# Patient Record
Sex: FEMALE | Race: WHITE | NOT HISPANIC OR LATINO | Employment: UNEMPLOYED | URBAN - NONMETROPOLITAN AREA
[De-identification: names, ages, dates, MRNs, and addresses within clinical notes are randomized per-mention and may not be internally consistent; named-entity substitution may affect disease eponyms.]

---

## 2017-02-07 ENCOUNTER — OFFICE VISIT (OUTPATIENT)
Dept: CARDIOLOGY | Facility: CLINIC | Age: 67
End: 2017-02-07

## 2017-02-07 VITALS
DIASTOLIC BLOOD PRESSURE: 79 MMHG | WEIGHT: 196.1 LBS | SYSTOLIC BLOOD PRESSURE: 130 MMHG | HEIGHT: 66 IN | HEART RATE: 93 BPM | OXYGEN SATURATION: 95 % | BODY MASS INDEX: 31.52 KG/M2

## 2017-02-07 DIAGNOSIS — I10 ESSENTIAL HYPERTENSION: ICD-10-CM

## 2017-02-07 DIAGNOSIS — E66.9 NON MORBID OBESITY, UNSPECIFIED OBESITY TYPE: ICD-10-CM

## 2017-02-07 DIAGNOSIS — E78.2 MIXED HYPERLIPIDEMIA: ICD-10-CM

## 2017-02-07 DIAGNOSIS — I25.10 CORONARY ARTERY DISEASE INVOLVING NATIVE CORONARY ARTERY OF NATIVE HEART WITHOUT ANGINA PECTORIS: Primary | ICD-10-CM

## 2017-02-07 DIAGNOSIS — E11.9 DIABETES MELLITUS TYPE II, NON INSULIN DEPENDENT (HCC): ICD-10-CM

## 2017-02-07 PROCEDURE — 99214 OFFICE O/P EST MOD 30 MIN: CPT | Performed by: INTERNAL MEDICINE

## 2017-02-07 NOTE — PROGRESS NOTES
Subjective   Darby Zuniga is a 66 y.o. female.     Chief Complaint   Patient presents with   • Follow-up   • Coronary Artery Disease       History of Present Illness     Mrs. Zuniga is a very pleasant 66-year-old woman who presents for follow-up of known coronary artery disease. She initially presented in 2000 with a myocardial infarction. At that time she had stents placed in the right coronary artery. She underwent restenting of the right coronary artery in 2008. She also has an history of an abdominal aortic aneurysm which is previously undergone intervention. She eventually underwent a recent evaluation of her aorta with a CT scan which was unremarkable. She also had relatively atypical chest discomfort and underwent a stress test which was also unremarkable. She also complained of claudication and underwent ABIs which were again normal. In any case her symptoms seemed to progress in terms of her chest discomfort and in November 2015 she had worsening chest discomfort and was found to have severe disease in the right coronary artery and underwent drug-eluting stent implantation to this vessel. She notes that since that time she continues to have rare episodes of chest pain that are not necessarily related to physical activity. She denies that rest resolves them. She states that there is been no clear pattern to her symptoms and that her symptoms are sporadic. There is been no progression. She has been compliant with risk factor modification and did discontinue tobacco consumption.  In any case, she apparently was evaluated by her oncologist who sent her to [place] where she had a stent to an unknown vessel.  She does note that this seemed to make her breathing better as well as her chest discomfort.  She was told that she had a 75% blockage.  She has remained tobacco free as noted above.  She states that her blood pressures have been well-controlled.  She has not had any recent falls.  She has not had a  recent lipid panel.    The following portions of the patient's history were reviewed and updated as appropriate: allergies, current medications, past family history, past medical history, past social history, past surgical history and problem list.    Review of Systems   Constitutional: Negative for activity change, appetite change and fatigue.   HENT: Negative for congestion and tinnitus.    Eyes: Negative for visual disturbance.   Respiratory: Positive for shortness of breath. Negative for cough and chest tightness.    Cardiovascular: Positive for leg swelling. Negative for chest pain and palpitations.   Gastrointestinal: Positive for nausea. Negative for blood in stool and vomiting.   Endocrine: Negative for cold intolerance, heat intolerance and polyuria.   Genitourinary: Negative for dysuria.   Musculoskeletal: Positive for myalgias and neck pain.   Skin: Negative for rash.   Neurological: Positive for light-headedness. Negative for dizziness, syncope and weakness.   Hematological: Bruises/bleeds easily.   Psychiatric/Behavioral: Negative for confusion and sleep disturbance.       Objective   Physical Exam   Constitutional: She is oriented to person, place, and time. She appears well-developed and well-nourished. No distress.   HENT:   Head: Normocephalic and atraumatic.   Nose: Nose normal.   Mouth/Throat: Oropharynx is clear and moist.   Eyes: Conjunctivae and EOM are normal. Right eye exhibits no discharge. Left eye exhibits no discharge. No scleral icterus.   Neck: Normal range of motion. No hepatojugular reflux and no JVD present. Carotid bruit is not present. No tracheal deviation present.   Cardiovascular: Normal rate, regular rhythm, S1 normal, S2 normal and intact distal pulses.  PMI is not displaced.  Exam reveals no gallop, no S3, no S4 and no friction rub.    No murmur heard.  Pulmonary/Chest: Effort normal and breath sounds normal. No accessory muscle usage. No respiratory distress. She has no  wheezes. She has no rales. She exhibits no tenderness.   There is mildly decreased breath sounds bilaterally.   Abdominal: Soft. Bowel sounds are normal. She exhibits no distension. There is no tenderness.   Musculoskeletal: Normal range of motion. She exhibits no edema or tenderness.       Vascular Status -  Her exam exhibits no right foot edema. Her exam exhibits no left foot edema.  Neurological: She is alert and oriented to person, place, and time. No cranial nerve deficit. Coordination normal.   Skin: Skin is warm and dry. She is not diaphoretic.   Nursing note and vitals reviewed.      Procedures    Assessment/Plan       Coronary Artery Disease. In regard to her history of coronary artery disease, it is somewhat difficult to ascertain her current status.  Specifically, she seems to deny that she had a coronary stent placed however does state that her chest discomfort and shortness of breath seem improved.  I will try to obtain a copy of her records to further clarify her situation.  In any case, she is not having any significant symptoms at this time.      Tobacco.  In regard to their history of tobacco consumption, I have congratulated her on discontinuing tobacco products and encouraged her to remain tobacco free.     Hypertension.  In regard to her history of hypertension, her blood pressures are currently relatively well controlled.  I have not changed any of her medications.  I've asked her to maintain a blood pressure diary.     Hyperlipidemia.  Given the patient's history of CAD I have asked them to obtain a fasting lipid panel and an AST and ALT.    In short, it is been a pleasure to participate in Darby's care, I look forward to seeing her again in 6 months.

## 2017-02-14 ENCOUNTER — TELEPHONE (OUTPATIENT)
Dept: CARDIOLOGY | Facility: CLINIC | Age: 67
End: 2017-02-14

## 2017-02-14 NOTE — TELEPHONE ENCOUNTER
PT CALLED STATING THAT DR. EWING WANTED TO KNOW THE NAME & THE NUMBER FOR HER CANCER DRRay WRIGHT  #[phone]

## 2017-03-23 ENCOUNTER — LAB (OUTPATIENT)
Dept: LAB | Facility: HOSPITAL | Age: 67
End: 2017-03-23
Attending: INTERNAL MEDICINE

## 2017-03-23 DIAGNOSIS — E78.2 MIXED HYPERLIPIDEMIA: ICD-10-CM

## 2017-03-23 DIAGNOSIS — I25.10 CORONARY ARTERY DISEASE INVOLVING NATIVE CORONARY ARTERY OF NATIVE HEART WITHOUT ANGINA PECTORIS: ICD-10-CM

## 2017-03-23 LAB
ALT SERPL W P-5'-P-CCNC: 27 U/L (ref 10–36)
AST SERPL-CCNC: 32 U/L (ref 10–30)
CHOLEST SERPL-MCNC: 169 MG/DL (ref 0–200)
HDLC SERPL-MCNC: 76 MG/DL (ref 60–100)
LDLC SERPL CALC-MCNC: 64 MG/DL (ref 0–100)
LDLC/HDLC SERPL: 0.84 {RATIO}
TRIGL SERPL-MCNC: 146 MG/DL (ref 0–150)
VLDLC SERPL-MCNC: 29.2 MG/DL

## 2017-03-23 PROCEDURE — 84460 ALANINE AMINO (ALT) (SGPT): CPT | Performed by: INTERNAL MEDICINE

## 2017-03-23 PROCEDURE — 36415 COLL VENOUS BLD VENIPUNCTURE: CPT

## 2017-03-23 PROCEDURE — 80061 LIPID PANEL: CPT | Performed by: INTERNAL MEDICINE

## 2017-03-23 PROCEDURE — 84450 TRANSFERASE (AST) (SGOT): CPT | Performed by: INTERNAL MEDICINE

## 2017-03-27 ENCOUNTER — TELEPHONE (OUTPATIENT)
Dept: CARDIOLOGY | Facility: CLINIC | Age: 67
End: 2017-03-27

## 2017-03-27 NOTE — TELEPHONE ENCOUNTER
----- Message from Arnold Vanegas MD sent at 3/25/2017  9:23 AM EDT -----  Let her know her cholesterol test was good    sdf      CALLED PT TO MAKE HER AWARE THAT PER DR. VANEGAS HER CHOLESTEROL TEST WAS GOOD.    PT AWARE & UNDERSTANDS.    CSMA

## 2017-06-19 RX ORDER — METOPROLOL TARTRATE 50 MG/1
TABLET, FILM COATED ORAL
Qty: 60 TABLET | Refills: 4 | Status: SHIPPED | OUTPATIENT
Start: 2017-06-19 | End: 2018-02-20

## 2017-08-25 ENCOUNTER — OFFICE VISIT (OUTPATIENT)
Dept: CARDIOLOGY | Facility: CLINIC | Age: 67
End: 2017-08-25

## 2017-08-25 VITALS
SYSTOLIC BLOOD PRESSURE: 102 MMHG | BODY MASS INDEX: 32.24 KG/M2 | HEART RATE: 76 BPM | DIASTOLIC BLOOD PRESSURE: 64 MMHG | HEIGHT: 66 IN | OXYGEN SATURATION: 94 % | WEIGHT: 200.6 LBS

## 2017-08-25 DIAGNOSIS — E78.2 MIXED HYPERLIPIDEMIA: ICD-10-CM

## 2017-08-25 DIAGNOSIS — I10 ESSENTIAL HYPERTENSION: ICD-10-CM

## 2017-08-25 DIAGNOSIS — I25.10 CORONARY ARTERY DISEASE INVOLVING NATIVE CORONARY ARTERY OF NATIVE HEART WITHOUT ANGINA PECTORIS: Primary | ICD-10-CM

## 2017-08-25 DIAGNOSIS — Z72.0 TOBACCO CONSUMPTION: ICD-10-CM

## 2017-08-25 DIAGNOSIS — E11.9 DIABETES MELLITUS TYPE II, NON INSULIN DEPENDENT (HCC): ICD-10-CM

## 2017-08-25 DIAGNOSIS — E66.9 NON MORBID OBESITY, UNSPECIFIED OBESITY TYPE: ICD-10-CM

## 2017-08-25 DIAGNOSIS — I25.2 MI, OLD: ICD-10-CM

## 2017-08-25 PROCEDURE — 99214 OFFICE O/P EST MOD 30 MIN: CPT | Performed by: INTERNAL MEDICINE

## 2017-08-25 RX ORDER — METOPROLOL TARTRATE 50 MG/1
25 TABLET, FILM COATED ORAL 2 TIMES DAILY
COMMUNITY

## 2017-08-25 RX ORDER — CHOLECALCIFEROL (VITAMIN D3) 25 MCG
TABLET,CHEWABLE ORAL DAILY
COMMUNITY

## 2017-08-25 RX ORDER — PHENYTOIN SODIUM 100 MG/1
CAPSULE, EXTENDED RELEASE ORAL
COMMUNITY

## 2017-08-25 RX ORDER — HYDROXYUREA 500 MG/1
500 CAPSULE ORAL 2 TIMES DAILY
COMMUNITY

## 2017-08-25 RX ORDER — ATORVASTATIN CALCIUM 40 MG/1
40 TABLET, FILM COATED ORAL NIGHTLY
COMMUNITY

## 2017-08-25 RX ORDER — MONTELUKAST SODIUM 4 MG/1
1 TABLET, CHEWABLE ORAL DAILY
COMMUNITY

## 2017-08-25 RX ORDER — BENZONATATE 200 MG/1
200 CAPSULE ORAL 3 TIMES DAILY
COMMUNITY

## 2017-08-25 RX ORDER — ESCITALOPRAM OXALATE 10 MG/1
10 TABLET ORAL DAILY
COMMUNITY

## 2017-08-25 NOTE — PROGRESS NOTES
Subjective   Darby Zuniga is a 67 y.o. female.     Chief Complaint   Patient presents with   • Follow-up     LEG AND FEET SWELLING   • Coronary Artery Disease   • Hypertension   • Hyperlipidemia       History of Present Illness     Mrs. Zuniga is a very pleasant 66-year-old woman who presents for follow-up of known coronary artery disease. She initially presented in 2000 with a myocardial infarction. At that time she had stents placed in the right coronary artery. She underwent restenting of the right coronary artery in 2008. She also has an history of an abdominal aortic aneurysm which is previously undergone intervention. She eventually underwent a recent evaluation of her aorta with a CT scan which was unremarkable. She also had relatively atypical chest discomfort and underwent a stress test which was also unremarkable. She also complained of claudication and underwent ABIs which were again normal. In any case her symptoms seemed to progress in terms of her chest discomfort and in November 2015 she had worsening chest discomfort and was found to have severe disease in the right coronary artery and underwent drug-eluting stent implantation to this vessel. She notes that since that time she continues to have rare episodes of chest pain that are not necessarily related to physical activity. She denies that rest resolves them. She states that there is been no clear pattern to her symptoms and that her symptoms are sporadic. There is been no progression. She has been compliant with risk factor modification and did discontinue tobacco consumption.  In any case, she apparently was evaluated by her oncologist who sent her to [place] where she underwent stent implantation to her left subclavian artery.  She currently states that she is doing relatively well.  She denies any angina or anginal-like symptoms.  She denies any congestive heart failure symptoms.  She denies any palpitations, near syncope or syncopal  symptoms.  She notes that her blood pressures have demonstrated reasonable control.  She has not had any significant change in her weight.  She does note lower extremity discomfort.  She did have ABIs in 2015 which were reasonably normal     The following portions of the patient's history were reviewed and updated as appropriate: allergies, current medications, past family history, past medical history, past social history, past surgical history and problem list.    Review of Systems   Constitutional: Negative for activity change, appetite change and fatigue.   HENT: Negative for congestion and tinnitus.    Eyes: Positive for visual disturbance.   Respiratory: Positive for chest tightness. Negative for cough and shortness of breath.    Cardiovascular: Positive for chest pain and leg swelling. Negative for palpitations.   Gastrointestinal: Negative for blood in stool, nausea and vomiting.   Endocrine: Positive for polyuria. Negative for cold intolerance and heat intolerance.   Genitourinary: Negative for dysuria.   Musculoskeletal: Positive for myalgias and neck pain.   Skin: Negative for rash.   Neurological: Positive for dizziness, weakness and light-headedness. Negative for syncope.   Hematological: Bruises/bleeds easily.   Psychiatric/Behavioral: Negative for confusion and sleep disturbance.       Objective   Physical Exam   Constitutional: She is oriented to person, place, and time. She appears well-developed and well-nourished. No distress.   HENT:   Head: Normocephalic and atraumatic.   Nose: Nose normal.   Mouth/Throat: Oropharynx is clear and moist.   Eyes: Conjunctivae and EOM are normal. Right eye exhibits no discharge. Left eye exhibits no discharge. No scleral icterus.   Neck: Normal range of motion. No hepatojugular reflux and no JVD present. Carotid bruit is not present. No tracheal deviation present.   Cardiovascular: Normal rate, regular rhythm, S1 normal, S2 normal and intact distal pulses.  PMI is  not displaced.  Exam reveals no gallop, no S3, no S4 and no friction rub.    No murmur heard.  Pulmonary/Chest: Effort normal and breath sounds normal. No accessory muscle usage. No respiratory distress. She has no wheezes. She has no rales. She exhibits no tenderness.   Abdominal: Soft. Bowel sounds are normal. She exhibits no distension. There is no tenderness.   Musculoskeletal: Normal range of motion. She exhibits no edema or tenderness.       Vascular Status -  Her exam exhibits no right foot edema. Her exam exhibits no left foot edema.  Neurological: She is alert and oriented to person, place, and time. No cranial nerve deficit. Coordination normal.   Skin: Skin is warm and dry. She is not diaphoretic.   Nursing note and vitals reviewed.      Procedures    Assessment/Plan     Coronary Artery Disease. In regard to her history of coronary artery disease she currently appears to be relatively stable.  Specifically, she denies any angina or anginal-like symptoms and has been compliant on medical therapy.  For now I will continue her current medications as is.    PVD In regard to her history of peripheral vascular disease, I feel that she is currently stable.  Specifically, her radial pulses are intact.  She does have a question of some diminished pulses in her lower extremities however she did have a recent TAO performed which was unremarkable.  Furthermore, she is not symptomatic.  For now I would simply continue her current medications as is.     Tobacco.  In regard to their history of tobacco consumption, I have congratulated her on discontinuing tobacco products and encouraged her to remain tobacco free.      Hypertension.  In regard to her history of hypertension, her blood pressures are currently relatively well controlled.  I have not changed any of her medications.  I've asked her to maintain a blood pressure diary.      Hyperlipidemia.  Given the patient's history of CAD I have asked them to obtain a  fasting lipid panel and an AST and ALT.    In short, it is been a pleasure to participate in Darby's care, I look forward to seeing her again in 6 months.

## 2017-11-15 RX ORDER — METOPROLOL TARTRATE 50 MG/1
TABLET, FILM COATED ORAL
Qty: 60 TABLET | Refills: 11 | Status: SHIPPED | OUTPATIENT
Start: 2017-11-15 | End: 2018-02-20

## 2017-11-26 ENCOUNTER — APPOINTMENT (OUTPATIENT)
Dept: GENERAL RADIOLOGY | Facility: HOSPITAL | Age: 67
End: 2017-11-26

## 2017-11-26 ENCOUNTER — HOSPITAL ENCOUNTER (EMERGENCY)
Facility: HOSPITAL | Age: 67
Discharge: HOME | End: 2017-11-26
Attending: FAMILY MEDICINE | Admitting: NURSE PRACTITIONER

## 2017-11-26 VITALS
BODY MASS INDEX: 29.99 KG/M2 | HEIGHT: 65 IN | DIASTOLIC BLOOD PRESSURE: 65 MMHG | HEART RATE: 98 BPM | TEMPERATURE: 99.1 F | OXYGEN SATURATION: 93 % | WEIGHT: 180 LBS | SYSTOLIC BLOOD PRESSURE: 106 MMHG | RESPIRATION RATE: 18 BRPM

## 2017-11-26 DIAGNOSIS — J20.9 ACUTE BRONCHITIS, UNSPECIFIED ORGANISM: Primary | ICD-10-CM

## 2017-11-26 LAB
ACANTHOCYTES BLD QL SMEAR: ABNORMAL
ALBUMIN SERPL-MCNC: 4.4 G/DL (ref 3.4–4.8)
ALBUMIN/GLOB SERPL: 1.1 G/DL (ref 1.5–2.5)
ALP SERPL-CCNC: 246 U/L (ref 35–104)
ALT SERPL W P-5'-P-CCNC: 22 U/L (ref 10–36)
ANION GAP SERPL CALCULATED.3IONS-SCNC: 8.7 MMOL/L (ref 3.6–11.2)
AST SERPL-CCNC: 25 U/L (ref 10–30)
BILIRUB SERPL-MCNC: 0.4 MG/DL (ref 0.2–1.8)
BUN BLD-MCNC: 14 MG/DL (ref 7–21)
BUN/CREAT SERPL: 20.3 (ref 7–25)
CALCIUM SPEC-SCNC: 9.3 MG/DL (ref 7.7–10)
CHLORIDE SERPL-SCNC: 100 MMOL/L (ref 99–112)
CO2 SERPL-SCNC: 30.3 MMOL/L (ref 24.3–31.9)
CREAT BLD-MCNC: 0.69 MG/DL (ref 0.43–1.29)
D-LACTATE SERPL-SCNC: 1.6 MMOL/L (ref 0.5–2)
DEPRECATED RDW RBC AUTO: 60.4 FL (ref 37–54)
EOSINOPHIL # BLD MANUAL: 0.03 10*3/MM3 (ref 0–0.7)
EOSINOPHIL NFR BLD MANUAL: 1 % (ref 0–7)
ERYTHROCYTE [DISTWIDTH] IN BLOOD BY AUTOMATED COUNT: 18.2 % (ref 11.5–14.5)
FLUAV AG NPH QL: NEGATIVE
FLUBV AG NPH QL IA: NEGATIVE
GFR SERPL CREATININE-BSD FRML MDRD: 85 ML/MIN/1.73
GLOBULIN UR ELPH-MCNC: 4.1 GM/DL
GLUCOSE BLD-MCNC: 111 MG/DL (ref 70–110)
HCT VFR BLD AUTO: 27.8 % (ref 37–47)
HGB BLD-MCNC: 8.8 G/DL (ref 12–16)
LYMPHOCYTES # BLD MANUAL: 1.96 10*3/MM3 (ref 1–3)
LYMPHOCYTES NFR BLD MANUAL: 7 % (ref 0–12)
LYMPHOCYTES NFR BLD MANUAL: 77 % (ref 16–46)
MCH RBC QN AUTO: 30 PG (ref 27–33)
MCHC RBC AUTO-ENTMCNC: 31.7 G/DL (ref 33–37)
MCV RBC AUTO: 94.9 FL (ref 80–94)
MONOCYTES # BLD AUTO: 0.18 10*3/MM3 (ref 0.1–0.9)
NEUTROPHILS # BLD AUTO: 0.38 10*3/MM3 (ref 1.4–6.5)
NEUTROPHILS NFR BLD MANUAL: 15 % (ref 40–75)
OSMOLALITY SERPL CALC.SUM OF ELEC: 278.7 MOSM/KG (ref 273–305)
PLATELET # BLD AUTO: 64 10*3/MM3 (ref 130–400)
PMV BLD AUTO: 12.5 FL (ref 6–10)
POTASSIUM BLD-SCNC: 3.4 MMOL/L (ref 3.5–5.3)
PROT SERPL-MCNC: 8.5 G/DL (ref 6–8)
RBC # BLD AUTO: 2.93 10*6/MM3 (ref 4.2–5.4)
SCAN SLIDE: NORMAL
SMALL PLATELETS BLD QL SMEAR: ABNORMAL
SODIUM BLD-SCNC: 139 MMOL/L (ref 135–153)
TROPONIN I SERPL-MCNC: 0.01 NG/ML
TROPONIN I SERPL-MCNC: 0.01 NG/ML
WBC NRBC COR # BLD: 2.55 10*3/MM3 (ref 4.5–12.5)

## 2017-11-26 PROCEDURE — 85025 COMPLETE CBC W/AUTO DIFF WBC: CPT | Performed by: PHYSICIAN ASSISTANT

## 2017-11-26 PROCEDURE — 94640 AIRWAY INHALATION TREATMENT: CPT

## 2017-11-26 PROCEDURE — 96365 THER/PROPH/DIAG IV INF INIT: CPT

## 2017-11-26 PROCEDURE — 80053 COMPREHEN METABOLIC PANEL: CPT | Performed by: PHYSICIAN ASSISTANT

## 2017-11-26 PROCEDURE — 93005 ELECTROCARDIOGRAM TRACING: CPT | Performed by: PHYSICIAN ASSISTANT

## 2017-11-26 PROCEDURE — 84484 ASSAY OF TROPONIN QUANT: CPT | Performed by: PHYSICIAN ASSISTANT

## 2017-11-26 PROCEDURE — 87804 INFLUENZA ASSAY W/OPTIC: CPT | Performed by: PHYSICIAN ASSISTANT

## 2017-11-26 PROCEDURE — 94799 UNLISTED PULMONARY SVC/PX: CPT

## 2017-11-26 PROCEDURE — 25010000002 CEFTRIAXONE: Performed by: PHYSICIAN ASSISTANT

## 2017-11-26 PROCEDURE — 99284 EMERGENCY DEPT VISIT MOD MDM: CPT

## 2017-11-26 PROCEDURE — 83605 ASSAY OF LACTIC ACID: CPT | Performed by: PHYSICIAN ASSISTANT

## 2017-11-26 PROCEDURE — 93010 ELECTROCARDIOGRAM REPORT: CPT | Performed by: INTERNAL MEDICINE

## 2017-11-26 PROCEDURE — 25010000002 METHYLPREDNISOLONE PER 125 MG: Performed by: PHYSICIAN ASSISTANT

## 2017-11-26 PROCEDURE — 71020 XR CHEST 2 VW: CPT | Performed by: RADIOLOGY

## 2017-11-26 PROCEDURE — 96375 TX/PRO/DX INJ NEW DRUG ADDON: CPT

## 2017-11-26 PROCEDURE — 85007 BL SMEAR W/DIFF WBC COUNT: CPT | Performed by: PHYSICIAN ASSISTANT

## 2017-11-26 PROCEDURE — 71020 HC CHEST PA AND LATERAL: CPT

## 2017-11-26 RX ORDER — ASPIRIN 325 MG
325 TABLET, DELAYED RELEASE (ENTERIC COATED) ORAL ONCE
Status: COMPLETED | OUTPATIENT
Start: 2017-11-26 | End: 2017-11-26

## 2017-11-26 RX ORDER — ALBUTEROL SULFATE 90 UG/1
2 AEROSOL, METERED RESPIRATORY (INHALATION) EVERY 4 HOURS PRN
Qty: 6.7 G | Refills: 0 | Status: SHIPPED | OUTPATIENT
Start: 2017-11-26

## 2017-11-26 RX ORDER — IPRATROPIUM BROMIDE AND ALBUTEROL SULFATE 2.5; .5 MG/3ML; MG/3ML
3 SOLUTION RESPIRATORY (INHALATION) ONCE
Status: COMPLETED | OUTPATIENT
Start: 2017-11-26 | End: 2017-11-26

## 2017-11-26 RX ORDER — PREDNISONE 20 MG/1
20 TABLET ORAL 2 TIMES DAILY
Qty: 10 TABLET | Refills: 0 | Status: SHIPPED | OUTPATIENT
Start: 2017-11-26 | End: 2017-12-01

## 2017-11-26 RX ORDER — METHYLPREDNISOLONE SODIUM SUCCINATE 125 MG/2ML
125 INJECTION, POWDER, LYOPHILIZED, FOR SOLUTION INTRAMUSCULAR; INTRAVENOUS ONCE
Status: COMPLETED | OUTPATIENT
Start: 2017-11-26 | End: 2017-11-26

## 2017-11-26 RX ADMIN — METHYLPREDNISOLONE SODIUM SUCCINATE 125 MG: 125 INJECTION, POWDER, FOR SOLUTION INTRAMUSCULAR; INTRAVENOUS at 17:36

## 2017-11-26 RX ADMIN — ASPIRIN 325 MG: 325 TABLET, COATED ORAL at 17:37

## 2017-11-26 RX ADMIN — IPRATROPIUM BROMIDE AND ALBUTEROL SULFATE 3 ML: .5; 3 SOLUTION RESPIRATORY (INHALATION) at 17:31

## 2017-11-26 RX ADMIN — CEFTRIAXONE 1 G: 1 INJECTION, POWDER, FOR SOLUTION INTRAMUSCULAR; INTRAVENOUS at 21:10

## 2017-11-26 NOTE — ED NOTES
Pt and family informed of reason for wait and plan of care. Pt resting on stretcher, alert and oriented, skin pwd, respirations even and unlabored. Fall precautions maintained.     Anjana Camilo RN  11/26/17 1942

## 2017-11-26 NOTE — ED PROVIDER NOTES
Subjective   Patient is a 67 y.o. female presenting with cough.   History provided by:  Patient   used: No    Cough   Cough characteristics:  Productive  Sputum characteristics:  Clear  Severity:  Moderate  Onset quality:  Sudden  Duration:  3 days  Timing:  Constant  Progression:  Unchanged  Chronicity:  Recurrent  Smoker: yes    Context: upper respiratory infection    Relieved by:  None tried  Worsened by:  Deep breathing and exposure to cold air  Ineffective treatments:  Beta-agonist inhaler and cough suppressants  Associated symptoms: myalgias, shortness of breath and wheezing    Associated symptoms: no chest pain, no chills, no ear pain, no fever, no headaches, no rash and no sore throat        Review of Systems   Constitutional: Negative for chills and fever.   HENT: Negative for congestion, ear pain and sore throat.    Respiratory: Positive for cough, shortness of breath and wheezing.    Cardiovascular: Negative for chest pain.   Gastrointestinal: Negative for diarrhea, nausea and vomiting.   Genitourinary: Negative for dysuria and flank pain.   Musculoskeletal: Positive for myalgias.   Skin: Negative for rash.   Neurological: Negative for headaches.   Psychiatric/Behavioral: The patient is not nervous/anxious.    All other systems reviewed and are negative.      Past Medical History:   Diagnosis Date   • AAA (abdominal aortic aneurysm)    • CAD (coronary artery disease)    • Diabetes mellitus     non-insulin dependent   • Elevated platelet count    • History of EKG 01/14/2016    BORDERLINE   • History of nuclear stress test    • Hyperlipidemia    • Hypertension    • Myocardial infarction 2000   • Obesity    • Tobacco abuse        Allergies   Allergen Reactions   • Ace Inhibitors        Past Surgical History:   Procedure Laterality Date   • CORONARY STENT PLACEMENT         Family History   Problem Relation Age of Onset   • Heart disease Mother      PACEMAKER   • Heart disease Brother       OPEN HEART       Social History     Social History   • Marital status: Single     Spouse name: N/A   • Number of children: N/A   • Years of education: N/A     Social History Main Topics   • Smoking status: Former Smoker   • Smokeless tobacco: Never Used   • Alcohol use No   • Drug use: No   • Sexual activity: Not Asked     Other Topics Concern   • None     Social History Narrative   • None           Objective   Physical Exam   Constitutional: She is oriented to person, place, and time. She appears well-developed and well-nourished.   HENT:   Head: Normocephalic.   Mouth/Throat: Oropharynx is clear and moist.   Neck: Neck supple.   Cardiovascular: Normal rate and regular rhythm.    Pulmonary/Chest: Effort normal. She has wheezes. She has rhonchi.   Abdominal: Soft. Bowel sounds are normal. There is no tenderness.   Musculoskeletal: Normal range of motion.   Neurological: She is alert and oriented to person, place, and time.   Skin: Skin is warm and dry.   Psychiatric: She has a normal mood and affect. Her behavior is normal. Judgment and thought content normal.   Nursing note and vitals reviewed.      Procedures         ED Course  ED Course   Comment By Time   Report given to WOODY Osorio 11/26 1954                  [facility]    Final diagnoses:   Acute bronchitis, unspecified organism            WOODY Hannah  11/27/17 2379

## 2017-11-27 NOTE — ED PROVIDER NOTES
Subjective   History of Present Illness    Review of Systems    Past Medical History:   Diagnosis Date   • AAA (abdominal aortic aneurysm)    • CAD (coronary artery disease)    • Diabetes mellitus     non-insulin dependent   • Elevated platelet count    • History of EKG 01/14/2016    BORDERLINE   • History of nuclear stress test    • Hyperlipidemia    • Hypertension    • Myocardial infarction 2000   • Obesity    • Tobacco abuse        Allergies   Allergen Reactions   • Ace Inhibitors        Past Surgical History:   Procedure Laterality Date   • CORONARY STENT PLACEMENT         Family History   Problem Relation Age of Onset   • Heart disease Mother      PACEMAKER   • Heart disease Brother      OPEN HEART       Social History     Social History   • Marital status: Single     Spouse name: N/A   • Number of children: N/A   • Years of education: N/A     Social History Main Topics   • Smoking status: Former Smoker   • Smokeless tobacco: Never Used   • Alcohol use No   • Drug use: No   • Sexual activity: Not Asked     Other Topics Concern   • None     Social History Narrative   • None           Objective   Physical Exam    Procedures         ED Course  ED Course   Comment By Time   Report given to WOODY Osorio 11/26 1955                  [facility]    Final diagnoses:   Acute bronchitis, unspecified organism            Fernando Bryant, APRN  11/26/17 2100

## 2018-02-20 ENCOUNTER — OFFICE VISIT (OUTPATIENT)
Dept: CARDIOLOGY | Facility: CLINIC | Age: 68
End: 2018-02-20

## 2018-02-20 VITALS
WEIGHT: 192 LBS | OXYGEN SATURATION: 90 % | HEIGHT: 66 IN | BODY MASS INDEX: 30.86 KG/M2 | DIASTOLIC BLOOD PRESSURE: 59 MMHG | HEART RATE: 89 BPM | SYSTOLIC BLOOD PRESSURE: 96 MMHG

## 2018-02-20 DIAGNOSIS — I10 ESSENTIAL HYPERTENSION: ICD-10-CM

## 2018-02-20 DIAGNOSIS — E78.2 MIXED HYPERLIPIDEMIA: ICD-10-CM

## 2018-02-20 DIAGNOSIS — I25.2 MI, OLD: ICD-10-CM

## 2018-02-20 DIAGNOSIS — I25.10 CORONARY ARTERY DISEASE INVOLVING NATIVE CORONARY ARTERY OF NATIVE HEART WITHOUT ANGINA PECTORIS: Primary | ICD-10-CM

## 2018-02-20 PROCEDURE — 99213 OFFICE O/P EST LOW 20 MIN: CPT | Performed by: INTERNAL MEDICINE

## 2018-02-20 NOTE — PROGRESS NOTES
[facility] CARDIOLOGY  [address]  [place] [address]  Phone: [phone]  Fax: [phone]    02/20/2018    Chief Complaint: Routine followup cad    History:   Darby Zuniga is a 67 y.o. female seen in followup, seen for CAD. Has chest discomfort. Some SOB. Nausea. Hurts in chest and back. Pt with anemia and requiring transfusion. Pt with leukemia and taking CTX. CTX for last year. On going therapy. Treated in [place].   Chest discomfort, epigastric. Radiated to should blades and back. Wt loss 20#. Pt with DM. Low BP today but no syncope. Pt with low energy. Taking 50 mg BID.              Mrs. Zuniga is a very pleasant 66-year-old woman who presents for follow-up of known coronary artery disease. She initially presented in 2000 with a myocardial infarction. At that time she had stents placed in the right coronary artery. She underwent restenting of the right coronary artery in 2008. She also has an history of an abdominal aortic aneurysm which is previously undergone intervention. She eventually underwent a recent evaluation of her aorta with a CT scan which was unremarkable. She also had relatively atypical chest discomfort and underwent a stress test which was also unremarkable. She also complained of claudication and underwent ABIs which were again normal. In any case her symptoms seemed to progress in terms of her chest discomfort and in November 2015 she had worsening chest discomfort and was found to have severe disease in the right coronary artery and underwent drug-eluting stent implantation to this vessel. She notes that since that time she continues to have rare episodes of chest pain that are not necessarily related to physical activity. She denies that rest resolves them. She states that there is been no clear pattern to her symptoms and that her symptoms are sporadic. There is been no progression. She has been compliant with risk factor modification and did  discontinue tobacco consumption.  In any case, she apparently was evaluated by her oncologist who sent her to [place] where she underwent stent implantation to her left subclavian artery.  She currently states that she is doing relatively well.  She denies any angina or anginal-like symptoms.  She denies any congestive heart failure symptoms.  She denies any palpitations, near syncope or syncopal symptoms.  She notes that her blood pressures have demonstrated reasonable control.  She has not had any significant change in her weight.  She does note lower extremity discomfort.  She did have ABIs in 2015 which were reasonably normal    Past Medical History:   Diagnosis Date   • AAA (abdominal aortic aneurysm)    • CAD (coronary artery disease)    • Diabetes mellitus     non-insulin dependent   • Elevated platelet count    • History of EKG 01/14/2016    BORDERLINE   • History of nuclear stress test    • Hyperlipidemia    • Hypertension    • Myocardial infarction 2000   • Obesity    • Tobacco abuse        Past Social History:  Social History     Social History   • Marital status: Single     Spouse name: N/A   • Number of children: N/A   • Years of education: N/A     Social History Main Topics   • Smoking status: Former Smoker   • Smokeless tobacco: Never Used   • Alcohol use No   • Drug use: No   • Sexual activity: Not Asked     Other Topics Concern   • None     Social History Narrative       Past Family History:  Family History   Problem Relation Age of Onset   • Heart disease Mother      PACEMAKER   • Heart disease Brother      OPEN HEART       Review of Systems:   Please see HPI  Constitution: No chills, no rigors, no unexplained weight loss or weight gain  Eyes:  No diplopia, no blurred vision, no loss of vision, conjunctiva is pink and sclera is anicteric  ENT:  No tinnitus, no otorrhea, no epistaxis, no sore throat   Respiratory: No cough, no hemoptysis  Cardiovascular: see HPI  Gastrointestinal: No nausea, no  vomiting, no hematemesis, no diarrhea or constipation, no melena  Genitourinary: No frequency of dysuria no hematuria  Integument: No pruritis and  no skin rash  Hematologic / Lymphatic: No excessive bleeding, easy bruising, fatigue, lymphadenopathy and petechiae  Musculoskeletal: No joint pain, joint stiffness, joint swelling, muscle pain, muscle weakness and neck pain  Neurological: No dizziness, headaches, light headedness, seizures and vertigo  Endocrine: No frequent urination and nocturia, temperature intolerance, weight gain, unintended and weight loss, unintended      Current Outpatient Prescriptions on File Prior to Visit   Medication Sig Dispense Refill   • albuterol (PROVENTIL HFA;VENTOLIN HFA) 108 (90 Base) MCG/ACT inhaler Inhale 2 puffs Every 4 (Four) Hours As Needed for Wheezing. 6.7 g 0   • ALPRAZolam (XANAX) 2 MG tablet Take 2 mg by mouth 3 (three) times a day     • aspirin 81 MG tablet Take 81 mg by mouth daily     • atorvastatin (LIPITOR) 40 MG tablet Take 40 mg by mouth Every Night.     • benzonatate (TESSALON) 200 MG capsule Take 200 mg by mouth 3 (Three) Times a Day.     • citalopram (CeleXA) 20 MG tablet Take 20 mg by mouth daily     • clopidogrel (PLAVIX) 75 MG tablet Take 75 mg by mouth daily     • colestipol (COLESTID) 1 g tablet Take 1 g by mouth Daily.     • cyanocobalamin (VITAMIN B-12) 2500 MCG tablet tablet Take  by mouth Daily.     • cyclobenzaprine (FLEXERIL) 10 MG tablet Take 10 mg by mouth daily     • escitalopram (LEXAPRO) 10 MG tablet Take 10 mg by mouth Daily.     • hydrochlorothiazide (HYDRODIURIL) 25 MG tablet Take 25 mg by mouth daily as needed     • Hydrocodone-Acetaminophen (LORTAB 10)  MG per tablet Take 1 tablet by mouth     • hydroxyurea (HYDREA) 500 MG capsule Take 500 mg by mouth 2 (Two) Times a Day.     • metFORMIN (GLUCOPHAGE) 500 MG tablet Take 500 mg by mouth Daily With Breakfast.     • metoprolol tartrate (LOPRESSOR) 50 MG tablet TAKE ONE TABLET BY MOUTH TWICE  A DAY 60 tablet 4   • metoprolol tartrate (LOPRESSOR) 50 MG tablet Take 50 mg by mouth 2 (Two) Times a Day.     • metoprolol tartrate (LOPRESSOR) 50 MG tablet TAKE ONE TABLET BY MOUTH TWICE A DAY 60 tablet 11   • omeprazole (PriLOSEC) 20 MG capsule Take 20 mg by mouth 2 (two) times a day     • phenytoin (DILANTIN) 100 MG ER capsule Take  by mouth 5 (Five) Times a Day.       No current facility-administered medications on file prior to visit.        Allergies   Allergen Reactions   • Ace Inhibitors        Objective:  Vitals:    18 1353   BP: 96/59   Pulse: 89   SpO2: 90%     Comfortable NAD  PERRL, conjunctiva clear  Neck supple, no JVD or thyromegaly appreciated  S1/S2 RRR, no m/r/g  Lungs CTA B, normal effort  Abdomen S/NT/ND (+) BS, no HSM appreciated  Extremities warm, no clubbing, cyanosis, or edema  Normal gait  No visible or palpable skin lesions  A/Ox4, mood and affect appropriate  Pulse exam: Mark's:    DATA:            Results for orders placed during the hospital encounter of 03/02/15   Stress test with myocardial perfusion    Narrative Patient:      JULIANE VAN    [facility] Rec#:     [id number]            :          1950            Date:         2015            Age:          64y                   Account#:     [id number]           Height:       168.4 cm / 66.3 in  Accession#:   [id number]               Sex:          F                      Weight:       86.4 kg / 190.4 lbs  BSA:          1.97  Admit Date#:  2015            Loc:          exam room 2    Referring:    RENY  Performing:   ALL  Reading:      Arnold Vanegas MD   Technologis[facility]CCARTCASSIE PARRA [facility]  Nuclear [facility]  ______________________________________________________________________    Combined Nuclear/Stress Test    ICD Codes:     • Diagnosis of CAD  • 401.1 Hypertension, benign  essential  • 411.1 Coronary syndrome, intermediate  • 414.9 Chronic  ischemic heart disease NOS  • 443.9,  272.4 491.20     Checklists:     • Patient verbally identified self  • Patient identified by ID band  • Patient consent obtained in lab  • Procedure verified and explained to patient  • History and physical performed  • Last Caffeine 03/01/2015 18:00:00  • Last Meal 03/01/2015 18:00:00    History of:   • Dyslipidemia• Lipid Therapy• Hypertension• Chronic Lung  Disease• Coronary Artery Disease (CAD)HX MI X1, STENTS X4.• Family  History of CAD• Family History of CHF• Menopause• Oophorectomy• Allergic  to ACE INHIBITERS• Diabetes, managed with oral agents• Diabetes for 5  years• Current smoker1/2 PPD• Typical Angina• Dyspnea at rest• Dyspnea  with minimal exertion• Dyspnea with normal daily activity• Dyspnea with  more than normal activity• Years at menopause or oophorectomy: 51No  History of:• Renal Failure• CHF• Peripheral Vascular Disease•  Cerebrovascular Disease• Estrogen Use  Cardiac Events and Interventions:  The patient's most recent MI occurred in 2000. Most recent stent placed  in 2005. Current Clinical Presentation:  The patient had no chest pain on presentation. The patient has no recent  history of CHF.     Medications I :  • SEE LIST         Discharge At Completion of Exam :  • Home     Baseline ECG:  Normal sinus rhythm. Normal repolarization.   Exercise Protocol:  Maximum heart rate was 112 bpm, 72% MPHR. RPP 85545. Exercise functional  capacity was poor. The patient perceived the level of exertion was  maximal (Leslie scale 20).   Pharmacologic Protocol:  The patient was unable to exercise due to ataxia, chronic obstructive  asthma, and  unspecified debility.    Stress ECG :  Normal sinus rhythm. ST segment response to stress was normal.   Recovery ECG:  Normal sinus rhythm. ST segment response during recovery was normal. 17  minutes into recovery. Medications were administered during the recovery  period. SEE ABOVE   Hemodynamics                   Rest        Stress        Recovery      SBP         110  mmHg    108 mmHg      101 mmHg      DBP         74 mmHg     63 mmHg       70 mmHg        HR =       102 bpm     112 bpm       97 bpm        %MPHR                   72 %                          Imaging Protocol:  This was a gated SPECT myocardial perfusion imaging study. Attentuation  correction could not be performed on this study. A one day rest-stress  imaging protocol was followed using Tc-99m sestamibi (Cardiolite)  injected intravenously. Attentuation correction could not be performed  on this study. For the rest portion of the study, 11.6 mCi of the  radiopharmaceutical was administered at 03/02/2015 08:45:10. For the  stress portion of the study, 30.2 mCi was administered at 03/02/2015  11:25:18.     Perfusion Interpretation:  The left ventricular cavity size was normal under post stress  conditions. The left ventricular cavity size was normal under rest  conditions. TID Ratio 0.84. Perfusion at stress and rest conditions was  normal.     Wall Motion Interpretation:  Gated imaging under post-stress conditions demonstrated normal wall  motion.   The patient's calculated post stress LVEF was 95%. The patient's end  diastolic volume was 52ml. The patient's end systolic volume was 3ml.     Study Limitations:  The overall quality of the study was good.     Nuclear Cardiology Conclusion:  Normal LV perfusion.  Normal regadenoson myocardial perfusion   with Tc-99m sestamibi imaging.   Stress testing induced no symptoms and no ECG changes consistent with  ischemia (72% MPHR).   Normal LV size. Global left ventricular systolic  function was normal, with an EF of 95%.      Stress Test Conclusion:  Non-diagnosticNon-diagnostic stress test. , with non-diagnostic ECG.   There was a normal heart rate response, with a normal blood pressure  response.  The patient experienced no chest pain. Symptoms noted during  stress include: dyspnea. The test was terminated due to fatigue. An  inadequate level of stress was achieved.    Conclusions  *1) This is a negative stress test for evidence of ischemia or prior  infarct.  *2) There is preserved LV function with a calculated EF of 95%    Electronically signed by: Arnold Vanegas MD on 2015 04:35:01  Thank you for the courtesy of this referral.       Results for orders placed during the hospital encounter of 03/02/15   Stress test with myocardial perfusion    Narrative Patient:      JULIANE VAN    [facility] Rec#:     [id number]            :          1950            Date:         2015            Age:          64y                   Account#:     [id number]           Height:       168.4 cm / 66.3 in  Accession#:   [id number]               Sex:          F                      Weight:       86.4 kg / 190.4 lbs  BSA:          1.97  Admit Date#:  2015            Loc:          exam room 2    Referring:    RENY  Performing:   ALL  Reading:      Arnold Vanegas MD   TechnologisMCCPHILIP PARRA [facility]  Nuclear Med [facility]  ______________________________________________________________________    Combined Nuclear/Stress Test    ICD Codes:     • Diagnosis of CAD  • 401.1 Hypertension, benign  essential  • 411.1 Coronary syndrome, intermediate  • 414.9 Chronic  ischemic heart disease NOS  • 443.9, 272.4, 491.20     Checklists:     • Patient verbally identified self  • Patient identified by ID band  • Patient consent obtained in lab  • Procedure verified and explained to patient  • History and physical performed  • Last Caffeine 2015 18:00:00  • Last Meal 2015 18:00:00    History of:   • Dyslipidemia• Lipid Therapy• Hypertension• Chronic Lung  Disease• Coronary Artery Disease (CAD)HX MI X1, STENTS X4.• Family  History of CAD• Family History of CHF• Menopause• Oophorectomy• Allergic  to ACE INHIBITERS• Diabetes, managed with oral agents• Diabetes for 5  years• Current smoker1/2 PPD• Typical Angina• Dyspnea at rest• Dyspnea  with minimal exertion•  Dyspnea with normal daily activity• Dyspnea with  more than normal activity• Years at menopause or oophorectomy: 51No  History of:• Renal Failure• CHF• Peripheral Vascular Disease•  Cerebrovascular Disease• Estrogen Use  Cardiac Events and Interventions:  The patient's most recent MI occurred in 2000. Most recent stent placed  in 2005. Current Clinical Presentation:  The patient had no chest pain on presentation. The patient has no recent  history of CHF.     Medications I :  • SEE LIST         Discharge At Completion of Exam :  • Home     Baseline ECG:  Normal sinus rhythm. Normal repolarization.   Exercise Protocol:  Maximum heart rate was 112 bpm, 72% MPHR. RPP 94410. Exercise functional  capacity was poor. The patient perceived the level of exertion was  maximal (Leslie scale 20).   Pharmacologic Protocol:  The patient was unable to exercise due to ataxia, chronic obstructive  asthma, and  unspecified debility.    Stress ECG :  Normal sinus rhythm. ST segment response to stress was normal.   Recovery ECG:  Normal sinus rhythm. ST segment response during recovery was normal. 17  minutes into recovery. Medications were administered during the recovery  period. SEE ABOVE   Hemodynamics                   Rest        Stress        Recovery      SBP         110 mmHg    108 mmHg      101 mmHg      DBP         74 mmHg     63 mmHg       70 mmHg        HR =       102 bpm     112 bpm       97 bpm        %MPHR                   72 %                          Imaging Protocol:  This was a gated SPECT myocardial perfusion imaging study. Attentuation  correction could not be performed on this study. A one day rest-stress  imaging protocol was followed using Tc-99m sestamibi (Cardiolite)  injected intravenously. Attentuation correction could not be performed  on this study. For the rest portion of the study, 11.6 mCi of the  radiopharmaceutical was administered at 03/02/2015 08:45:10. For the  stress portion of the study, 30.2 mCi  was administered at 03/02/2015  11:25:18.     Perfusion Interpretation:  The left ventricular cavity size was normal under post stress  conditions. The left ventricular cavity size was normal under rest  conditions. TID Ratio 0.84. Perfusion at stress and rest conditions was  normal.     Wall Motion Interpretation:  Gated imaging under post-stress conditions demonstrated normal wall  motion.   The patient's calculated post stress LVEF was 95%. The patient's end  diastolic volume was 52ml. The patient's end systolic volume was 3ml.     Study Limitations:  The overall quality of the study was good.     Nuclear Cardiology Conclusion:  Normal LV perfusion.  Normal regadenoson myocardial perfusion   with Tc-99m sestamibi imaging.   Stress testing induced no symptoms and no ECG changes consistent with  ischemia (72% MPHR).   Normal LV size. Global left ventricular systolic  function was normal, with an EF of 95%.      Stress Test Conclusion:  Non-diagnosticNon-diagnostic stress test. , with non-diagnostic ECG.   There was a normal heart rate response, with a normal blood pressure  response.  The patient experienced no chest pain. Symptoms noted during  stress include: dyspnea. The test was terminated due to fatigue. An  inadequate level of stress was achieved.   Conclusions  *1) This is a negative stress test for evidence of ischemia or prior  infarct.  *2) There is preserved LV function with a calculated EF of 95%    Electronically signed by: Arnold Vanegas MD on 03/07/2015 04:35:01  Thank you for the courtesy of this referral.       Results for orders placed during the hospital encounter of 11/03/15   Cardiac catheterization    [place], [place] [address]  [phone]    NAME:                    JULIANE VAN  ROOM NUMBER:             [id number]  MEDICAL RECORD NUMBER:   [id number]  VISIT NUMBER:            [id number]  PHYSICIAN:                    Arnold Vanegas  MD*  PRIMARY CARE PROVIDER:     DATE OF PROCEDURE:       11/03/2015    YOB: 1950    IMPRESSION:  1.  Right dominant coronary artery system with single-vessel disease involving  the right coronary artery.   2.  Successful angioplasty and stenting of the right coronary artery.     PROCEDURES PERFORMED:  1.  Coronary angiography.   2.  Angioplasty and stenting of the right coronary artery.    HISTORY: The patient is a pleasant 64-year-old woman who initially presented in  2000 with myocardial infarction.  At that time, she had stents placed in the  right coronary artery.  She had a right coronary artery restented in 2008.  She  had a history of an abdominal aortic aneurysm supposedly; however, further  evaluation of this was relatively unremarkable. She did recently present with  recurrent episodes of chest discomfort that she states were reminiscent of her  initial presentation with coronary artery disease. Because of this, she had  undergone stress testing, which was unremarkable and as such, she was relegated  to medical management. She was placed on both Lopressor and nitrates; however,  had continued episodes of chest discomfort. Because of this, she was referred  for cardiac catheterization. Prior to the procedure, the patient was given  informed consent apprising the risk of heart attack, stroke, and death. The  patient understood the risks and agreed to proceed.      DESCRIPTION OF PROCEDURE: The patient was brought to the cardiac  catheterization laboratory and prepped and draped in the usual sterile fashion.  Adequate anesthesia was obtained by infiltrating the right groin with local  lidocaine. Using a modified Seldinger technique, a 5-Divehi sheath was placed  in the right radial artery. A 5-Divehi Alan catheter was used to engage the  ostium of left main coronary artery. Angiography was performed in varying views  using hand injection of contrast material. After this, the  5-Slovak Alan  catheter was used to engage the ostium of the right coronary artery.   Angiography was again performed in varying views using hand injection of  contrast material. At this point, she was noted to have critical in-stent  restenosis of her distal right coronary artery, as well as moderate in-stent  restenosis of her mid right coronary artery. There was a slight area of  irregularity at the very proximal stent margin.  However, this was felt not to  be hemodynamically obstructive. There was no dye hang-up in this region. Based  on this, it was decided to proceed with percutaneous revascularization of the  right coronary artery.     The patient was given adequate heparinization to achieve an AST greater than  200. She had previously been on Plavix and as such she did not receive 2b3a  inhibition. She had a BMW wire that was placed atraumatically in the distal  portion of the right coronary artery. She initially had both the mid and distal  lesion dilated with a 2.5 mm balloon. An attempt was made to predilate the  lesion with a 3 mm balloon.  However, there was some difficulty advancing the  balloon into the proximal portion of the stent. The vessel was double wired and  then the balloon was easily passed in the distal portion and both the distal  and mid lesions were easily dilated with a 3.0 mm balloon. A 3.0 x 15 mm Alpine  drug-eluting stent was then advanced into the coronary artery. However, it was  difficult to advance the stent beyond the proximal portion of the previously  implanted stents. On the second wire, a balloon was placed at the origin of the  stent and then the initial stent was easily deployed at the distal portion of  the right coronary artery.  The same method was attempted to deliver the second  stent.  However, this was unsuccessful despite added use of items such as a  guide liner.  Eventually, a 2.75 x 8 mm drug-eluting stent was deployed at the  mid portion of the right  coronary artery.  The distal stent had been deployed  at 16 atmospheres.  The mid stent had been deployed at 18 atmospheres. Attempts  were then made to advance a noncompliant balloon. However, these were  unsuccessful. Further evaluation of the proximal portion of the right coronary  suggested that there was a slight irregularity again at the proximal portion of  the previously implanted stents. This was felt to be most likely chronic in  nature. There was no dye hang-up and there was PRAFUL-3 flow in the vessel. It  was felt at this point given that the patient was relatively uncomfortable and  the procedure had already been relatively prolonged to conclude the procedure  again at this juncture. The patient was returned to the [facility] without evidence  of complication.     FINDINGS IN DETAIL:  Prior to angiography, fluoroscopy reveals dense  calcification of the left main coronary artery and the proximal LAD.     ANGIOGRAPHIC FINDINGS IN DETAIL:   1.  Left Main Coronary Artery: The left main coronary is a large vessel which  bifurcates into LAD and circumflex arteries. The left main coronary is free of  atherosclerotic disease.   2.  Left Anterior Descending Artery: The left anterior descending artery is a  large vessel which reaches beyond the apex of the ventricle and gives rise to 2  diagonal branches, both of which are moderate in size. The LAD and its branches  are free of atherosclerotic disease.   3.  Circumflex Artery: The circumflex artery is a large vessel which gives rise  to 2 obtuse marginal branches. There is a previously implanted stent in the  proximal portion of the circumflex artery.  It is relatively free of  atherosclerotic disease.    4.  Right Coronary Artery: The right coronary artery is a large vessel which  gives rise to posterior descending artery and several posterolateral branches.  The entire mid segment of the right coronary artery is stented. There is  approximately 50 to 60 mm stent  material implanted.  At the very proximal  portion of the previously implanted stents, there is a 30% in-stent restenosis.   There is a slight pocket noted at this juncture. This appears to be old and  chronic in nature. There is no dye hang-up in this area. There is a 70% lesion  in the midportion of the right coronary artery. Post intervention, this is  reduced to 0% residual stenosis without evidence of dissection and PRAFUL-3 flow  in the distal vascular bed. The distal right coronary artery is the culprit  lesion. The lesion length is approximately 10 mm.  The lesion type is type A  for low risk.  This is again the culprit lesion with PRAFUL flow prior to the  lesion is PRAFUL-3; PRAFUL flow post is PRAFUL-3.     FINAL IMPRESSION AND PLAN: Overall, it is my impression that this patient has  undergone successful percutaneous revascularization of the right coronary  artery.  I do feel that more than likely she has been adequately revascularized  and we will continue clinical followup at this point in time.  If she continues  to have symptoms, it would not be unreasonable to consider intravascular  ultrasound evaluation of the proximal portion of the right coronary artery.  Again, it would have been my preference to perform this at the time of the  procedure itself today; however, the patient wanted to get off the table in the  middle of the procedure and at that point the procedure was completed, and it  was felt that she had been adequately revascularized. She will be observed  overnight and followed closely clinically.         D:   SHELIA 11/03/2015 17:50:27  T:   renita 11/04/2015 08:22:07  JOB ID:[id number]  [id number]  Revision Count:     0  cc:  Dr. Hendricks                             Signature:__________________________                                     Arnold Vanegas MD*  DO NOT TEXT EDIT THIS LINE :[id number]:  Authenticated by ARNOLD VANEGAS M.D. On 11/12/2015 11:57:51 AM              A/P:    CAd: some chest  discomfort. Will order lexiscan sT and echo. Decrease lopressor to 25 mg BID.     Hypotension: low energy. Decrease lopressor to 25mg BID and may need to stop shortly.    F/u 2-3 weeks.      Thank you for allowing me to participate in the care of Darby Zuniga. Feel free to contact me directly with any further questions or concerns.